# Patient Record
Sex: MALE | Race: WHITE
[De-identification: names, ages, dates, MRNs, and addresses within clinical notes are randomized per-mention and may not be internally consistent; named-entity substitution may affect disease eponyms.]

---

## 2018-12-18 ENCOUNTER — HOSPITAL ENCOUNTER (OUTPATIENT)
Dept: HOSPITAL 97 - 4TH | Age: 38
Setting detail: OBSERVATION
LOS: 1 days | Discharge: HOME | End: 2018-12-19
Attending: HOSPITALIST | Admitting: INTERNAL MEDICINE
Payer: COMMERCIAL

## 2018-12-18 VITALS — BODY MASS INDEX: 28.7 KG/M2
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
## 2018-12-18 DIAGNOSIS — R11.2: ICD-10-CM

## 2018-12-18 DIAGNOSIS — T62.91XA: Primary | ICD-10-CM

## 2018-12-18 DIAGNOSIS — K21.9: ICD-10-CM

## 2018-12-18 PROCEDURE — 81015 MICROSCOPIC EXAM OF URINE: CPT

## 2018-12-18 PROCEDURE — 80053 COMPREHEN METABOLIC PANEL: CPT

## 2018-12-18 PROCEDURE — 81003 URINALYSIS AUTO W/O SCOPE: CPT

## 2018-12-18 PROCEDURE — 83690 ASSAY OF LIPASE: CPT

## 2018-12-18 PROCEDURE — 80076 HEPATIC FUNCTION PANEL: CPT

## 2018-12-18 PROCEDURE — 36415 COLL VENOUS BLD VENIPUNCTURE: CPT

## 2018-12-18 PROCEDURE — 85025 COMPLETE CBC W/AUTO DIFF WBC: CPT

## 2018-12-18 PROCEDURE — 74177 CT ABD & PELVIS W/CONTRAST: CPT

## 2018-12-18 PROCEDURE — 82150 ASSAY OF AMYLASE: CPT

## 2018-12-18 PROCEDURE — 80048 BASIC METABOLIC PNL TOTAL CA: CPT

## 2018-12-19 VITALS — TEMPERATURE: 98.6 F | SYSTOLIC BLOOD PRESSURE: 121 MMHG | DIASTOLIC BLOOD PRESSURE: 82 MMHG

## 2018-12-19 VITALS — OXYGEN SATURATION: 96 %

## 2018-12-19 LAB
ALBUMIN SERPL BCP-MCNC: 3.4 G/DL (ref 3.4–5)
ALBUMIN SERPL BCP-MCNC: 3.4 G/DL (ref 3.4–5)
ALP SERPL-CCNC: 80 U/L (ref 45–117)
ALP SERPL-CCNC: 81 U/L (ref 45–117)
ALT SERPL W P-5'-P-CCNC: 59 U/L (ref 12–78)
ALT SERPL W P-5'-P-CCNC: 64 U/L (ref 12–78)
AMYLASE SERPL-CCNC: 47 U/L (ref 25–115)
AST SERPL W P-5'-P-CCNC: 21 U/L (ref 15–37)
AST SERPL W P-5'-P-CCNC: 24 U/L (ref 15–37)
BUN BLD-MCNC: 10 MG/DL (ref 7–18)
BUN BLD-MCNC: 12 MG/DL (ref 7–18)
GLUCOSE SERPLBLD-MCNC: 103 MG/DL (ref 74–106)
GLUCOSE SERPLBLD-MCNC: 111 MG/DL (ref 74–106)
HCT VFR BLD CALC: 41.8 % (ref 39.6–49)
HCT VFR BLD CALC: 41.9 % (ref 39.6–49)
LIPASE SERPL-CCNC: 133 U/L (ref 73–393)
LYMPHOCYTES # SPEC AUTO: 1.7 K/UL (ref 0.7–4.9)
LYMPHOCYTES # SPEC AUTO: 2.1 K/UL (ref 0.7–4.9)
PMV BLD: 9.2 FL (ref 7.6–11.3)
PMV BLD: 9.4 FL (ref 7.6–11.3)
POTASSIUM SERPL-SCNC: 3.8 MMOL/L (ref 3.5–5.1)
POTASSIUM SERPL-SCNC: 3.9 MMOL/L (ref 3.5–5.1)
RBC # BLD: 4.68 M/UL (ref 4.33–5.43)
RBC # BLD: 4.69 M/UL (ref 4.33–5.43)
UA COMPLETE W REFLEX CULTURE PNL UR: (no result)
UA DIPSTICK W REFLEX MICRO PNL UR: (no result)

## 2018-12-19 RX ADMIN — SODIUM CHLORIDE SCH MLS: 0.9 INJECTION, SOLUTION INTRAVENOUS at 00:02

## 2018-12-19 RX ADMIN — SODIUM CHLORIDE SCH MLS: 0.9 INJECTION, SOLUTION INTRAVENOUS at 05:36

## 2018-12-19 NOTE — RAD REPORT
EXAM DESCRIPTION:  CTAbdomen   Pelvis W Contrast - 12/19/2018 10:58 am

 

CLINICAL HISTORY:  Abdominal pain.

abdominal pain

 

COMPARISON:  No comparisons

 

TECHNIQUE:  Biphasic CT imaging of the abdomen and pelvis was performed with 100 ml non-ionic IV cont
rast.

 

All CT scans are performed using dose optimization technique as appropriate and may include automated
 exposure control or mA/KV adjustment according to patient size.

 

FINDINGS:  The lung bases are clear.

 

The liver demonstrates fatty infiltration. The spleen, pancreas, adrenal glands and right kidney are 
within normal limits. Small 2 mm nonobstructing left renal calculus.

 

No bowel obstruction, free air, free fluid or abscess.  The appendix is normal.  No evidence of signi
ficant lymphadenopathy. Small bilateral fat containing inguinal hernias.

 

No suspicious bony findings.

 

IMPRESSION:  No acute intra-abdominal or pelvic finding.

 

Fatty liver.

 

Small nonobstructing left renal stone.

## 2018-12-19 NOTE — P.SSS
Patient History


Date of Service: 12/19/18


Reason for admission: Intractable nausea and vomit


History of Present Illness: 





MR. SANTORO AFTER EATING STEAK LUNCH STARTED HAVING NAUSEA AND VOMITING.  HE 

ALSO HAD VERTIGO IN HISTORY AND NOW ALSO. HE IS LOT BETTER NOWL ABLE TO 

TOLERATE FOOD.  CT SCAN IS NEGATIVE AND HE IS STABLE TO GO HOME.


Allergies





No Known Allergies Allergy (Unverified 05/09/15 22:52)


 





Home Medications: 








Albuterol Sulfate [Proair Hfa] 2 puff IH Q4H PRN 12/18/18 


Meclizine HCl 25 mg PO TID #40 tab.chew 12/19/18 


Ondansetron [Zofran] 4 mg PO Q6H PRN #50 tab 12/19/18 








- Past Medical/Surgical History


Has patient received pneumonia vaccine in the past: No


Diabetic: No


-: vertigo


-: GERD


-: asthma


-: knee sx





- Family History


  ** Father


-: Hypertension, Other (see notes)


Notes: reports get dehydrated easily





  ** Mother


-: Other (see notes)


Notes: Thyroid issues





- Social History


Smoking Status: Never smoker


Alcohol use: Yes


CD- Drugs: No


Caffeine use: Yes


Place of Residence: Home





Review of Systems


10-point ROS is otherwise unremarkable


General: Weakness


Gastrointestinal: Nausea, Vomiting





Physical Examination





- Vital Signs


Temperature: 98.6 F


Blood Pressure: 121/82


Pulse: 82


Respirations: 16


Pulse Ox (%): 98





- Physical Exam


General: Mild distress


HEENT: Atraumatic, PERRLA, Mucous membr. moist/pink, EOMI, Sclerae nonicteric


Neck: Supple, 2+ carotid pulse no bruit, No LAD, Without JVD or thyroid 

abnormality


Respiratory: Clear to auscultation bilaterally, Normal air movement


Cardiovascular: Regular rate/rhythm, Normal S1 S2


Gastrointestinal: Normal bowel sounds, No tenderness


Musculoskeletal: No tenderness


Integumentary: No rashes


Neurological: Normal gait, Normal speech, Normal strength at 5/5 x4 extr, 

Normal tone, Normal affect


Lymphatics: No axilla or inguinal lymphadenopathy





- Studies


Laboratory Data (last 24 hrs)





12/19/18 09:29: Sodium 143, Potassium 3.8, BUN 10, Creatinine 1.10, Glucose 103

, Total Bilirubin 0.6, AST 21, ALT 59, Alkaline Phosphatase 81, Amylase 47, 

Lipase 133


12/19/18 09:29: WBC 10.9, Hgb 14.8, Hct 41.9, Plt Count 195


12/19/18 04:02: Sodium 143, Potassium 3.9, BUN 12, Creatinine 1.10, Glucose 111 

H, Total Bilirubin 0.5, AST 24, ALT 64, Alkaline Phosphatase 80


12/19/18 04:02: WBC 11.2 H, Hgb 14.5, Hct 41.8, Plt Count 209








- Diagnosis (Problem(s))


(1) Food poisoning


Current Visit: Yes   Status: Acute   


Plan: 


STABLE, RECOVERED. 


ZOFRAN PO AND ANTIVERT PRN 


LIGHT DIET FOR A FEW DAYS.








- Disposition


Disposition: ROUTINE DISCHARGE


Condition: FAIR

## 2018-12-19 NOTE — P.HP
Certification for Inpatient


Patient admitted to: Observation


With expected LOS: <2 Midnights


Patient will require the following post-hospital care: None


Practitioner: I am a practitioner with admitting privileges, knowledge of 

patient current condition, hospital course, and medical plan of care.


Services: Services provided to patient in accordance with Admission 

requirements found in Title 42 Section 412.3 of the Code of Federal Regulations





Patient History


Date of Service: 12/18/18


Reason for admission: Intractable nausea and vomit


History of Present Illness: 





Patient is a 38-year-old gentleman came into the hospital with abdominal pain & 

intractable nausea and vomiting.  This happened after he ate lunch.  He had 

multiple episodes of nausea and vomiting and finally went to [facility] emergency 

room.  Patient was transferred to our facility if for admission as he was not 

able to keep anything down at [facility].  Patient was admitted and treated with IV 

fluids.


Allergies





No Known Allergies Allergy (Unverified 05/09/15 22:52)


 





Home Medications: 








Albuterol Sulfate [Proair Hfa] 2 puff IH Q4H PRN 12/18/18 








- Past Medical/Surgical History


Has patient received pneumonia vaccine in the past: No


Diabetic: No


-: vertigo


-: GERD


-: asthma


-: knee sx





- Family History


  ** Father


Medical History: Hypertension, Other (see notes)


Notes: reports get dehydrated easily





  ** Mother


Medical History: Other (see notes)


Notes: Thyroid issues





- Social History


Smoking Status: Never smoker


Alcohol use: Yes


CD- Drugs: No


Caffeine use: Yes


Place of Residence: Home





Review of Systems


10-point ROS is otherwise unremarkable





Physical Examination





- Vital Signs


Temperature: 98.3 F


Blood Pressure: 121/81


Pulse: 84


Respirations: 16


Pulse Ox (%): 96





- Physical Exam


General: Alert, In no apparent distress, Oriented x3


HEENT: Atraumatic, PERRLA, Mucous membr. moist/pink, EOMI, Sclerae nonicteric


Neck: Supple, 2+ carotid pulse no bruit, No LAD, Without JVD or thyroid 

abnormality


Respiratory: Clear to auscultation bilaterally, Normal air movement


Cardiovascular: Regular rate/rhythm, Normal S1 S2, No murmurs


Gastrointestinal: Normal bowel sounds, Soft and benign, Non-distended, No 

tenderness


Musculoskeletal: No clubbing, No swelling, No tenderness


Integumentary: No rashes


Neurological: Normal gait, Normal speech, Normal strength at 5/5 x4 extr, 

Normal tone, Sensation intact, Cranial nerves 3-12 intact, Normal affect


Lymphatics: No axilla or inguinal lymphadenopathy





- Studies


Laboratory Data (last 24 hrs)





12/19/18 04:02: Sodium 143, Potassium 3.9, BUN 12, Creatinine 1.10, Glucose 111 

H, Total Bilirubin 0.5, AST 24, ALT 64, Alkaline Phosphatase 80


12/19/18 04:02: WBC 11.2 H, Hgb 14.5, Hct 41.8, Plt Count 209








Assessment & Plan





- Problems (Diagnosis)


(1) Intractable abdominal pain


Onset Date: 12/19/18   Current Visit: Yes   Status: Acute   





(2) Intractable vomiting


Onset Date: 12/19/18   Current Visit: Yes   Status: Acute   





- Plan





1. Continue with IV hydration 


2. Continue with IV antiemetics


3. Continue with pain control


4. Clear liquid diet


5. Serial H&H, and we will monitor CBC, BMP, LFTs and lipase along with 

electrolytes.


6. GI and DVT prophylaxis





Patient is a patient of Dr. Mulligan.  Patient has been transferred to Dr. Mulligan.  

Treatment and billing per primary care provider.


Discharge Plan: Home


Plan to discharge in: 48 Hours





- Advance Directives


Does patient have a Living Will: No


Does patient have a Durable POA for Healthcare: No





- Code Status/Comfort Care


Code Status Assessed: Yes


Code Status: Full Code


Critical Care: No


Time Spent Managing PTS Care (In Minutes): 50

## 2019-04-12 NOTE — RAD REPORT
EXAM DESCRIPTION:  CT - Abdomen   Pelvis W Contrast - 4/12/2019 6:38 pm

 

CLINICAL HISTORY:  Abdominal pain with nausea.

 

COMPARISON:  April 12, 2019

 

TECHNIQUE:  Computed axial tomography of the abdomen pelvis was obtained. 100 cc Isovue-300 was admin
istered intravenously. Oral contrast was not requested which limits evaluation of bowel.

 

All CT scans are performed using dose optimization technique as appropriate and may include automated
 exposure control or mA/KV adjustment according to patient size.

 

FINDINGS:  The fatty liver

 

Spleen, pancreas, and adrenal appear unremarkable.

 

Right kidney unremarkable. 3 millimeter nonobstructing left renal calculus.

 

There is no evidence of diverticulitis. Normal appendix.

 

Bilateral hernias contain fat. Small hiatal hernia

 

IMPRESSION:  3 millimeter nonobstructing left renal calculus.

 

Bilateral inguinal hernias contain fat

## 2019-04-12 NOTE — RAD REPORT
EXAM DESCRIPTION:  CT - Stone Protocol - 4/12/2019 12:15 pm

 

CLINICAL HISTORY:  Abdominal pain. Right-sided abdominal pain

 

COMPARISON:  December 2018

 

TECHNIQUE:  Computed axial tomography of the abdomen pelvis was obtained without oral or IV contrast.
 Lack of IV and oral contrast limits evaluation of solid organs, bowel, and vessels. Coronal reformat
lisa images were obtained and reviewed.

 

All CT scans are performed using dose optimization technique as appropriate and may include automated
 exposure control or mA/KV adjustment according to patient size.

 

FINDINGS:  A a 3 millimeter left renal calculus is present unchanged from the prior exam. Hydronephro
sis is not noted. A right renal calculus is not seen. An ureteral calculus is not noted. A bladder ca
lculus is not present.

 

Fatty liver

 

The Spleen, pancreas and adrenals appear grossly normal

 

There is no evidence of diverticulitis. The appendix appears normal

 

Small bilateral inguinal hernias contain fat

 

A tiny umbilical hernia

 

IMPRESSION:  Small nonobstructing left renal calculus

## 2019-04-12 NOTE — RAD REPORT
EXAM DESCRIPTION:  RAD - Chest Pa And Lat (2 Views) - 4/12/2019 2:04 pm

 

CLINICAL HISTORY:  Back pain, chest pain

 

COMPARISON:  None.

 

TECHNIQUE:  PA and lateral views of the chest were obtained.

 

FINDINGS:  The lungs are clear.   Heart size is normal and central vasculature is within normal limit
s.  No pleural effusion or pneumothorax seen.  No acute bony finding noted.  No aortic abnormality.

 

IMPRESSION:  No acute cardiopulmonary process.

## 2019-04-13 NOTE — P.CNS
Date of Consult: 04/13/19





PC:  I was asked to see this 38-year-old male in regards to his inguinal 

hernias.





HPC:  Patient presented emergency room with right-sided abdominal pain. States 

this started in his back and then moved to his front.  Seems to have dissipated 

at the moment.  Describes it as severe.  It comes and goes cannot really 

specify what brings it on.  Has not noticed any bulge or discomfort in the 

groin area.





PMH:  Negative





PSHx:  NAD





SOC:  An allergy





SYS REVIEW:  No cough, wheeze, shortness of breath.  No chest pain or 

palpitations.  Denies any urinary complaints at this time.  Good exercise 

tolerance





O/E awake alert comfortable at the moment








HEENT:  Nonicteric





Chest:  Chest movement equal bilaterally





ABD:  Abdomen is soft, has a small umbilical hernia. Has some fat in the 

inguinal area. Mild bulge when the patient coughs.  Does not cause any pain or 

discomfort.





LOCO:  Intact





DATA:  CT scan demonstrates 2 small inguinal hernias her fat field. Also 

appears to have a small umbilical hernia.





IMPRESSION:  Back and abdominal pain.





PLAN:  This patient be discharged at this time.  He is going to have an MRI to 

evaluate his back pain. He will see me next Thursday in my office.  At that 

time we will discuss at length his surgical options so he could make him eat 

decision about whether not he wants to get these hernias repaired.  We will 

discuss techniques complications time off work excess dura at that visit.  

Thank you

## 2019-04-13 NOTE — EKG
Test Date:    2019-04-12               Test Time:    11:37:28

Technician:   TRAE                                     

                                                     

MEASUREMENT RESULTS:                                       

Intervals:                                           

Rate:         63                                     

WV:           152                                    

QRSD:         84                                     

QT:           390                                    

QTc:          399                                    

Axis:                                                

P:            57                                     

WV:           152                                    

QRS:          28                                     

T:            18                                     

                                                     

INTERPRETIVE STATEMENTS:                                       

                                                     

Normal sinus rhythm

Normal ECG

No previous ECG available for comparison



Electronically Signed On 04-13-19 09:43:17 CDT by Checo Foy

## 2019-04-13 NOTE — P.DS
Admission Date: 04/12/19


Discharge Date: 04/13/19


Disposition: ROUTINE DISCHARGE


Brief History of Present Illness: 





MR. SANTORO IS STABLE. HIS PAIN IN RLQ IS MORE TOWARDS R ING AREA AND HAS AS 

SEPARATE PAIN IN LOWER BACK. I TOLD HIM THAT A PAINFUL HERNIA CAN BE TREATED 

WITH SURGERY THAT CAN BE OUTPATIENT.  DR. BARR WILL COME SEE HIM TODAY AND 

HIS LOWER BACK WILL NEED MRI IF NOT WELL AND I WILL WORK ON IT OUTPATIENT.  HE 

IS STABLE FOR DISCHARGE.


Vital Signs/Physical Exam: 














Temp Pulse Resp BP Pulse Ox


 


 97.4 F   68   20   114/76   99 


 


 04/13/19 08:00  04/13/19 08:00  04/13/19 08:00  04/13/19 08:00  04/13/19 08:00








Laboratory Data at Discharge: 














WBC  6.7 K/uL (4.3-10.9)   04/13/19  05:28    


 


Hgb  14.8 g/dL (13.6-17.9)   04/13/19  05:28    


 


Hct  42.2 % (39.6-49.0)   04/13/19  05:28    


 


Plt Count  190 K/uL (152-406)   04/13/19  05:28    


 


PT  12.2 SECONDS (9.5-12.5)   04/12/19  12:02    


 


INR  1.04   04/12/19  12:02    


 


APTT  34.4 SECONDS (24.3-36.9)   04/12/19  12:02    


 


Sodium  143 mmol/L (136-145)   04/13/19  05:28    


 


Potassium  3.9 mmol/L (3.5-5.1)   04/13/19  05:28    


 


BUN  8 mg/dL (7-18)   04/13/19  05:28    


 


Creatinine  0.93 mg/dL (0.55-1.3)   04/13/19  05:28    


 


Glucose  95 mg/dL ()   04/13/19  05:28    


 


Phosphorus  2.6 mg/dL (2.5-4.9)   04/12/19  12:02    


 


Magnesium  2.0 mg/dL (1.8-2.4)   04/13/19  05:28    


 


Total Bilirubin  0.5 mg/dL (0.2-1.0)   04/12/19  12:02    


 


AST  22 U/L (15-37)   04/12/19  12:02    


 


ALT  52 U/L (12-78)   04/12/19  12:02    


 


Alkaline Phosphatase  94 U/L ()   04/12/19  12:02    








Home Medications: 








Gabapentin 100 mg PO TID #90 capsule 04/13/19 





New Medications: 


Gabapentin 100 mg PO TID #90 capsule